# Patient Record
Sex: MALE | Race: WHITE | Employment: FULL TIME | URBAN - METROPOLITAN AREA
[De-identification: names, ages, dates, MRNs, and addresses within clinical notes are randomized per-mention and may not be internally consistent; named-entity substitution may affect disease eponyms.]

---

## 2018-03-01 ENCOUNTER — OFFICE VISIT (OUTPATIENT)
Dept: FAMILY MEDICINE | Facility: CLINIC | Age: 18
End: 2018-03-01
Payer: COMMERCIAL

## 2018-03-01 VITALS
BODY MASS INDEX: 33.69 KG/M2 | SYSTOLIC BLOOD PRESSURE: 132 MMHG | HEART RATE: 68 BPM | HEIGHT: 76 IN | WEIGHT: 276.7 LBS | RESPIRATION RATE: 19 BRPM | DIASTOLIC BLOOD PRESSURE: 76 MMHG | TEMPERATURE: 98 F | OXYGEN SATURATION: 98 %

## 2018-03-01 DIAGNOSIS — R19.4 CHANGE IN BOWEL HABITS: ICD-10-CM

## 2018-03-01 DIAGNOSIS — R10.33 PERIUMBILICAL ABDOMINAL PAIN: Primary | ICD-10-CM

## 2018-03-01 LAB
ERYTHROCYTE [DISTWIDTH] IN BLOOD BY AUTOMATED COUNT: 12.3 % (ref 10–15)
HCT VFR BLD AUTO: 45 % (ref 40–53)
HGB BLD-MCNC: 15.8 G/DL (ref 13.3–17.7)
LIPASE SERPL-CCNC: 98 U/L (ref 73–393)
MCH RBC QN AUTO: 29.6 PG (ref 26.5–33)
MCHC RBC AUTO-ENTMCNC: 35.1 G/DL (ref 31.5–36.5)
MCV RBC AUTO: 84 FL (ref 78–100)
PLATELET # BLD AUTO: 236 10E9/L (ref 150–450)
RBC # BLD AUTO: 5.34 10E12/L (ref 4.4–5.9)
WBC # BLD AUTO: 6.3 10E9/L (ref 4–11)

## 2018-03-01 PROCEDURE — 83690 ASSAY OF LIPASE: CPT | Performed by: PHYSICIAN ASSISTANT

## 2018-03-01 PROCEDURE — 80053 COMPREHEN METABOLIC PANEL: CPT | Performed by: PHYSICIAN ASSISTANT

## 2018-03-01 PROCEDURE — 36415 COLL VENOUS BLD VENIPUNCTURE: CPT | Performed by: PHYSICIAN ASSISTANT

## 2018-03-01 PROCEDURE — 85027 COMPLETE CBC AUTOMATED: CPT | Performed by: PHYSICIAN ASSISTANT

## 2018-03-01 PROCEDURE — 84439 ASSAY OF FREE THYROXINE: CPT | Performed by: PHYSICIAN ASSISTANT

## 2018-03-01 PROCEDURE — 84443 ASSAY THYROID STIM HORMONE: CPT | Performed by: PHYSICIAN ASSISTANT

## 2018-03-01 PROCEDURE — 99213 OFFICE O/P EST LOW 20 MIN: CPT | Performed by: PHYSICIAN ASSISTANT

## 2018-03-01 NOTE — PATIENT INSTRUCTIONS
I would like you to start taking a probiotic daily. You can get it at any drug store.    I would also like you to start taking a daily fiber supplement such as metamucil

## 2018-03-01 NOTE — MR AVS SNAPSHOT
"              After Visit Summary   3/1/2018    Huy Perez    MRN: [id number]           Patient Information     Date Of Birth          2000        Visit Information        Provider Department      3/1/2018 1:40 PM Kevon Rosas PA-C [facility]        Today's Diagnoses     Periumbilical abdominal pain    -  1    Change in bowel habits          Care Instructions    I would like you to start taking a probiotic daily. You can get it at any drug store.    I would also like you to start taking a daily fiber supplement such as metamucil          Follow-ups after your visit        Who to contact     If you have questions or need follow up information about today's clinic visit or your schedule please contact [facility] directly at [phone].  Normal or non-critical lab and imaging results will be communicated to you by Granifyhart, letter or phone within 4 business days after the clinic has received the results. If you do not hear from us within 7 days, please contact the clinic through Granifyhart or phone. If you have a critical or abnormal lab result, we will notify you by phone as soon as possible.  Submit refill requests through Swifto or call your pharmacy and they will forward the refill request to us. Please allow 3 business days for your refill to be completed.          Additional Information About Your Visit        GranifyharRadiojar Information     Swifto lets you send messages to your doctor, view your test results, renew your prescriptions, schedule appointments and more. To sign up, go to [email or web address] . Click on \"Log in\" on the left side of the screen, which will take you to the Welcome page. Then click on \"Sign up Now\" on the right side of the page.     You will be asked to enter the access code listed below, as well as some personal information. Please follow the directions to create your username and password.     Your access code is: [email or web address]  Expires: " "2018  2:31 PM     Your access code will  in 90 days. If you need help or a new code, please call your [place] clinic or [phone].        Care EveryWhere ID     This is your Care EveryWhere ID. This could be used by other organizations to access your [place] medical records  [id number]        Your Vitals Were     Pulse Temperature Respirations Height Pulse Oximetry BMI (Body Mass Index)    68 98  F (36.7  C) (Tympanic) 19 6' 3.75\" (1.924 m) 98% 33.9 kg/m2       Blood Pressure from Last 3 Encounters:   18 132/76   16 108/72    Weight from Last 3 Encounters:   18 276 lb 11.2 oz (125.5 kg) (>99 %)*   16 209 lb 4.8 oz (94.9 kg) (96 %)*   06 86 lb (39 kg) (98 %)*     * Growth percentiles are based on [facility] 2-20 Years data.              We Performed the Following     CBC with platelets     Comprehensive metabolic panel     Lipase     TSH with free T4 reflex          Today's Medication Changes          These changes are accurate as of 3/1/18  2:31 PM.  If you have any questions, ask your nurse or doctor.               Stop taking these medicines if you haven't already. Please contact your care team if you have questions.     lisdexamfetamine 60 MG capsule   Commonly known as:  VYVANSE   Stopped by:  Kevon Rosas PA-C                    Primary Care Provider Office Phone # Fax #    Kevon Rosas PA-C [phone] [id number] [facility] [id number]        Equal Access to Services     [facility] AH: Hadii matt diaz hadashzeinab Soandrew, waaxda luqadaha, qaybta kaalmada aura mena. So [facility] [phone].    ATENCIÓN: Si habla español, tiene a barger disposición servicios gratuitos de asistencia lingüística. Llame al [phone].    We comply with applicable federal civil rights laws and [place] laws. We do not discriminate on the basis of race, color, national origin, age, disability, sex, sexual orientation, " or gender identity.            Thank you!     Thank you for choosing [facility]  for your care. Our goal is always to provide you with excellent care. Hearing back from our patients is one way we can continue to improve our services. Please take a few minutes to complete the written survey that you may receive in the mail after your visit with us. Thank you!             Your Updated Medication List - Protect others around you: Learn how to safely use, store and throw away your medicines at www.disposemymeds.org.          This list is accurate as of 3/1/18  2:31 PM.  Always use your most recent med list.                   Brand Name Dispense Instructions for use Diagnosis    clindamycin-benzoyl peroxide gel    BENZACLIN          tretinoin 0.025 % cream    RETIN-A

## 2018-03-01 NOTE — PROGRESS NOTES
"  SUBJECTIVE:   Huy Perez is a 17 year old male who presents to clinic today for the following health issues:    Gastrointestinal symptoms      Duration: 1 year     Description: ABDOMINAL PAIN - lower abdomen. Pain is described as aching.    Intensity:  moderate    Accompanying signs and symptoms:  nausea, vomitting, diarrhea, constipation and painful bowel movements    History  Previous {similar problem: YES- ongoing for a year   Previous evaluation:  none    Aggravating factors: none    Alleviating factors: None    Other Therapies tried: Tried to eat healthier, did not see a difference.    -Patient presents to discuss a one year hx of abdominal pain  -He notes it correlates with when he began weight resistance training  -Pain is not daily, more random;   -Estimates it occurs once q-o-week  -will last almost on entire day when the pain is on  -BMs seem to help for just a few minutes  -no change with food  -Is moving the bowels on avg 5-10 times daily   -sometimes painful BMs   -BMs are softer   -very rare vomiting  -there is no blood in the stool  -there are no episodes of night sweats, fevers, chills or extensive weight loss  -he denies urinary symptoms     -no travel history  -no antibiotic use    -Does take some supplements - amino acids; protein powders; creatine   -He has been on those since the onset of the pain  -he has recently been doing more \"bulking\", eating a lot of food and only taking creatine    -denies any anxiety    -he drinks quite a bit of water daily; estimates more than one gallon    Problem list and histories reviewed & adjusted, as indicated.  Additional history: as documented    Patient Active Problem List   Diagnosis     ADHD (attention deficit hyperactivity disorder)     Acne vulgaris     History reviewed. No pertinent surgical history.    Social History   Substance Use Topics     Smoking status: Never Smoker     Smokeless tobacco: Never Used     Alcohol use No     Family History " "  Problem Relation Age of Onset     Hypertension No family hx of            Reviewed and updated as needed this visit by clinical staff       Reviewed and updated as needed this visit by Provider         ROS:  Constitutional, HEENT, cardiovascular, pulmonary, gi and gu systems are negative, except as otherwise noted.    OBJECTIVE:     /76 (BP Location: Right arm, Patient Position: Chair, Cuff Size: Adult Large)  Pulse 68  Temp 98  F (36.7  C) (Tympanic)  Resp 19  Ht 6' 3.75\" (1.924 m)  Wt 276 lb 11.2 oz (125.5 kg)  SpO2 98%  BMI 33.9 kg/m2  Body mass index is 33.9 kg/(m^2).  GENERAL: healthy, alert and no distress  RESP: lungs clear to auscultation - no rales, rhonchi or wheezes  CV: regular rates and rhythm, normal S1 S2, no S3 or S4 and no murmur, click or rub  ABDOMEN: soft, nontender, no hepatosplenomegaly, no masses and bowel sounds normal  PSYCH: mentation appears normal, affect normal/bright    Diagnostic Test Results:  See A/P    ASSESSMENT/PLAN:   1. Periumbilical abdominal pain  2. Change in bowel habits  Huy presents with a one year hx of intermittent abdominal pain and increased BM. He notes the symptoms began soon after he began intensive focus on weight resistance training. He has added supplements to his diet, initially block chain amino acids and protein powders, more recently creatine loading. He is \"bulking\" currently and thus eating \"a lot more food than usual\" and \"drinking tons of water, more than a gallon\" daily. There is no blood in the stool. Symptoms last around one day and do not recur for a week or two. BMs continue with frequency regularly. There is no recent travel or antibiotic use. Certainly this diet/training could be playing a role. I discussed this with him. We'll also run some labs, though he preferred to hold off on stool studies or celiac testing. He denies problems with lactose. I have also advised starting a probiotic and fiber supplement. He'll follow up per the " labs and symptoms after around one month trial.    Kevon Rosas PA-C  [facility]

## 2018-03-02 LAB
ALBUMIN SERPL-MCNC: 4.6 G/DL (ref 3.4–5)
ALP SERPL-CCNC: 75 U/L (ref 65–260)
ALT SERPL W P-5'-P-CCNC: 50 U/L (ref 0–50)
ANION GAP SERPL CALCULATED.3IONS-SCNC: 3 MMOL/L (ref 3–14)
AST SERPL W P-5'-P-CCNC: 31 U/L (ref 0–35)
BILIRUB SERPL-MCNC: 0.4 MG/DL (ref 0.2–1.3)
BUN SERPL-MCNC: 14 MG/DL (ref 7–30)
CALCIUM SERPL-MCNC: 9.5 MG/DL (ref 8.5–10.1)
CHLORIDE SERPL-SCNC: 103 MMOL/L (ref 98–110)
CO2 SERPL-SCNC: 30 MMOL/L (ref 20–32)
CREAT SERPL-MCNC: 1.02 MG/DL (ref 0.5–1)
GFR SERPL CREATININE-BSD FRML MDRD: >90 ML/MIN/1.7M2
GLUCOSE SERPL-MCNC: 89 MG/DL (ref 70–99)
POTASSIUM SERPL-SCNC: 4.2 MMOL/L (ref 3.4–5.3)
PROT SERPL-MCNC: 7.5 G/DL (ref 6.8–8.8)
SODIUM SERPL-SCNC: 136 MMOL/L (ref 133–144)
T4 FREE SERPL-MCNC: 0.68 NG/DL (ref 0.76–1.46)
TSH SERPL DL<=0.005 MIU/L-ACNC: 6.02 MU/L (ref 0.4–4)

## 2018-03-04 DIAGNOSIS — R94.6 ABNORMAL FINDING ON THYROID FUNCTION TEST: Primary | ICD-10-CM

## 2018-03-27 DIAGNOSIS — R94.6 ABNORMAL FINDING ON THYROID FUNCTION TEST: ICD-10-CM

## 2018-03-27 PROCEDURE — 84443 ASSAY THYROID STIM HORMONE: CPT | Performed by: PHYSICIAN ASSISTANT

## 2018-03-27 PROCEDURE — 84439 ASSAY OF FREE THYROXINE: CPT | Performed by: PHYSICIAN ASSISTANT

## 2018-03-27 PROCEDURE — 36415 COLL VENOUS BLD VENIPUNCTURE: CPT | Performed by: PHYSICIAN ASSISTANT

## 2018-03-28 LAB
T4 FREE SERPL-MCNC: 0.74 NG/DL (ref 0.76–1.46)
TSH SERPL DL<=0.005 MIU/L-ACNC: 3.48 MU/L (ref 0.4–4)

## 2020-10-02 ENCOUNTER — APPOINTMENT (OUTPATIENT)
Dept: GENERAL RADIOLOGY | Facility: CLINIC | Age: 20
End: 2020-10-02
Attending: NURSE PRACTITIONER
Payer: OTHER MISCELLANEOUS

## 2020-10-02 ENCOUNTER — HOSPITAL ENCOUNTER (EMERGENCY)
Facility: CLINIC | Age: 20
Discharge: HOME | End: 2020-10-02
Attending: NURSE PRACTITIONER | Admitting: NURSE PRACTITIONER
Payer: OTHER MISCELLANEOUS

## 2020-10-02 VITALS
BODY MASS INDEX: 31.86 KG/M2 | RESPIRATION RATE: 16 BRPM | WEIGHT: 260 LBS | TEMPERATURE: 98.6 F | OXYGEN SATURATION: 98 % | SYSTOLIC BLOOD PRESSURE: 138 MMHG | HEART RATE: 77 BPM | DIASTOLIC BLOOD PRESSURE: 79 MMHG

## 2020-10-02 DIAGNOSIS — S81.811A LACERATION OF RIGHT LOWER EXTREMITY, INITIAL ENCOUNTER: ICD-10-CM

## 2020-10-02 DIAGNOSIS — M25.572 PAIN IN JOINT INVOLVING ANKLE AND FOOT, LEFT: ICD-10-CM

## 2020-10-02 DIAGNOSIS — M79.661 PAIN OF RIGHT LOWER LEG: ICD-10-CM
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
## 2020-10-02 DIAGNOSIS — M25.571 PAIN IN JOINT INVOLVING ANKLE AND FOOT, RIGHT: ICD-10-CM

## 2020-10-02 PROCEDURE — 90471 IMMUNIZATION ADMIN: CPT | Performed by: NURSE PRACTITIONER

## 2020-10-02 PROCEDURE — 90715 TDAP VACCINE 7 YRS/> IM: CPT | Performed by: NURSE PRACTITIONER

## 2020-10-02 PROCEDURE — 250N000011 HC RX IP 250 OP 636: Performed by: NURSE PRACTITIONER

## 2020-10-02 PROCEDURE — 73590 X-RAY EXAM OF LOWER LEG: CPT | Mod: RT

## 2020-10-02 PROCEDURE — 12001 RPR S/N/AX/GEN/TRNK 2.5CM/<: CPT | Performed by: NURSE PRACTITIONER

## 2020-10-02 PROCEDURE — 73630 X-RAY EXAM OF FOOT: CPT | Mod: RT

## 2020-10-02 PROCEDURE — 90471 IMMUNIZATION ADMIN: CPT

## 2020-10-02 PROCEDURE — 250N000013 HC RX MED GY IP 250 OP 250 PS 637: Performed by: NURSE PRACTITIONER

## 2020-10-02 PROCEDURE — 99285 EMERGENCY DEPT VISIT HI MDM: CPT | Mod: 25 | Performed by: NURSE PRACTITIONER

## 2020-10-02 PROCEDURE — 99284 EMERGENCY DEPT VISIT MOD MDM: CPT | Mod: 25 | Performed by: NURSE PRACTITIONER

## 2020-10-02 PROCEDURE — 73610 X-RAY EXAM OF ANKLE: CPT | Mod: 50

## 2020-10-02 RX ADMIN — IBUPROFEN 600 MG: 400 TABLET ORAL at 15:12

## 2020-10-02 RX ADMIN — CLOSTRIDIUM TETANI TOXOID ANTIGEN (FORMALDEHYDE INACTIVATED), CORYNEBACTERIUM DIPHTHERIAE TOXOID ANTIGEN (FORMALDEHYDE INACTIVATED), BORDETELLA PERTUSSIS TOXOID ANTIGEN (GLUTARALDEHYDE INACTIVATED), BORDETELLA PERTUSSIS FILAMENTOUS HEMAGGLUTININ ANTIGEN (FORMALDEHYDE INACTIVATED), BORDETELLA PERTUSSIS PERTACTIN ANTIGEN, AND BORDETELLA PERTUSSIS FIMBRIAE 2/3 ANTIGEN 0.5 ML: 5; 2; 2.5; 5; 3; 5 INJECTION, SUSPENSION INTRAMUSCULAR at 14:26

## 2020-10-02 ASSESSMENT — ENCOUNTER SYMPTOMS
BACK PAIN: 0
FEVER: 0
WEAKNESS: 0
NUMBNESS: 0
MYALGIAS: 1
NAUSEA: 0
COUGH: 0
VOMITING: 0
LIGHT-HEADEDNESS: 0
HEADACHES: 0
SHORTNESS OF BREATH: 0
DIZZINESS: 0
CHILLS: 0
NECK PAIN: 0
HEMATURIA: 0
WOUND: 1
JOINT SWELLING: 0
FATIGUE: 0
ABDOMINAL PAIN: 0
FACIAL SWELLING: 0
ARTHRALGIAS: 1
NECK STIFFNESS: 0

## 2020-10-02 NOTE — ED PROVIDER NOTES
"  History     Chief Complaint   Patient presents with     Laceration     pt coming to ED via EMS for fall from ladder, 2\" lac R leg, bilat ankle pain, no head injury, bleeding controlled.      HPI  Huy Perez is a 20 year old male who presents to the emergency department after suffering a fall while at work prior to arrival. Patient was standing on a ladder ~11 feet above a deck when it slipped causing him to fall. He fell straight down and landed still standing on the ladder. He suffered a laceration to the right shin from the pulley system on the ladder. There was no head injury or LOC, denies neck, back or hip pain. He is complaining of bilateral ankle pain and right sided tib/fib and foot pain. Denies numbness or tingling. No loss of bowel or bladder control. No saddle anesthesia. He arrived via EMS and is here with his mother.     Allergies:  No Known Allergies    Problem List:    Patient Active Problem List    Diagnosis Date Noted     ADHD (attention deficit hyperactivity disorder) 02/03/2016     Priority: Medium     Acne vulgaris 02/03/2016     Priority: Medium     Cleocin and retin A 025%        Past Medical History:    No past medical history on file.    Past Surgical History:    No past surgical history on file.    Family History:    Family History   Problem Relation Age of Onset     Hypertension No family hx of      Social History:  Marital Status:  Single [1]  Social History     Tobacco Use     Smoking status: Never Smoker     Smokeless tobacco: Never Used   Substance Use Topics     Alcohol use: No     Alcohol/week: 0.0 standard drinks     Drug use: No      Medications:         clindamycin-benzoyl peroxide (BENZACLIN) gel       tretinoin (RETIN-A) 0.025 % cream      Review of Systems   Constitutional: Negative for chills, fatigue and fever.   HENT: Negative for facial swelling.    Respiratory: Negative for cough and shortness of breath.    Cardiovascular: Negative for chest pain. "   Gastrointestinal: Negative for abdominal pain, nausea and vomiting.   Genitourinary: Negative for hematuria.   Musculoskeletal: Positive for arthralgias and myalgias. Negative for back pain, joint swelling, neck pain and neck stiffness.   Skin: Positive for wound. Negative for rash.   Neurological: Negative for dizziness, syncope, weakness, light-headedness, numbness and headaches.   All other systems reviewed and are negative.    Physical Exam   BP: 138/79  Pulse: 77  Temp: 98.6  F (37  C)  Resp: 16  Weight: 117.9 kg (260 lb)  SpO2: 98 %      Physical Exam  Constitutional:       General: He is not in acute distress.     Appearance: He is well-developed. He is not diaphoretic.   HENT:      Head: Normocephalic.   Eyes:      Conjunctiva/sclera: Conjunctivae normal.      Pupils: Pupils are equal, round, and reactive to light.   Neck:      Musculoskeletal: Normal range of motion and neck supple.   Cardiovascular:      Rate and Rhythm: Normal rate and regular rhythm.      Pulses: Normal pulses.   Pulmonary:      Effort: Pulmonary effort is normal. No respiratory distress.      Breath sounds: Normal breath sounds and air entry. No decreased air movement. No decreased breath sounds, wheezing or rhonchi.   Abdominal:      General: There is no distension.      Palpations: Abdomen is soft.      Tenderness: There is no abdominal tenderness.   Musculoskeletal: Normal range of motion.      Comments: Reported pain to the bilateral ankles. There is no deficits in either leg, ankle or foot. Pulses and perfusion are equal bilaterally. No overlying erythema, edema, abrasions, or ecchymosis. There is a 1 cm laceration to the proximal right shin, continued superficial abrasion proximally, bleeding controlled.      Skin:     General: Skin is warm.      Capillary Refill: Capillary refill takes less than 2 seconds.   Neurological:      General: No focal deficit present.      Mental Status: He is alert and oriented to person, place, and  time.   Psychiatric:         Mood and Affect: Mood normal.       ED Course        [facility]     -Laceration Repair    Date/Time: 10/2/2020 2:47 PM  Performed by: Thalia Mercado APRN CNP  Authorized by: Thalia Mercado APRN CNP       ANESTHESIA (see MAR for exact dosages):     Anesthesia method:  Local infiltration    Local anesthetic:  Procaine 0.5% w/o epi  LACERATION DETAILS     Location:  Leg    Leg location:  R lower leg    Length (cm):  1  EXPLORATION:     Wound exploration: wound explored through full range of motion and entire depth of wound probed and visualized      Wound extent: no underlying fracture      Contaminated: no      TREATMENT:     Area cleansed with:  Betadine and Hibiclens    Amount of cleaning:  Standard    Irrigation solution:  Sterile saline    Irrigation volume:  500    Irrigation method:  Syringe and pressure wash    Visualized foreign bodies/material removed: yes      SKIN REPAIR     Repair method:  Sutures    Suture size:  5-0    Suture material:  Nylon    Suture technique:  Simple interrupted    Number of sutures:  2    APPROXIMATION     Approximation:  Close    POST-PROCEDURE DETAILS     Dressing:  Antibiotic ointment and non-adherent dressing      PROCEDURE   Patient Tolerance:  Patient tolerated the procedure well with no immediate complications          Results for orders placed or performed during the hospital encounter of 10/02/20 (from the past 24 hour(s))   XR Ankle Left G/E 3 Views    Narrative    RIGHT FOOT THREE OR MORE VIEWS, TIBIA AND FIBULA RIGHT TWO VIEWS,  RIGHT ANKLE THREE OR MORE VIEWS, LEFT ANKLE THREE OR MORE VIEWS  10/2/2020 2:14 PM     HISTORY: Fall.    COMPARISON: None.      Impression    IMPRESSION:     Right ankle: No bony or soft tissue abnormality.    Right foot: No bony or soft tissue abnormality.    Right tibia/fibula: No bony or soft tissue abnormality.    Left ankle: No bony or soft tissue abnormality.   Foot  XR, G/E 3  views, right    Narrative    RIGHT FOOT THREE OR MORE VIEWS, TIBIA AND FIBULA RIGHT TWO VIEWS,  RIGHT ANKLE THREE OR MORE VIEWS, LEFT ANKLE THREE OR MORE VIEWS  10/2/2020 2:14 PM     HISTORY: Fall.    COMPARISON: None.      Impression    IMPRESSION:     Right ankle: No bony or soft tissue abnormality.    Right foot: No bony or soft tissue abnormality.    Right tibia/fibula: No bony or soft tissue abnormality.    Left ankle: No bony or soft tissue abnormality.   Ankle XR, G/E 3 views, right    Narrative    RIGHT FOOT THREE OR MORE VIEWS, TIBIA AND FIBULA RIGHT TWO VIEWS,  RIGHT ANKLE THREE OR MORE VIEWS, LEFT ANKLE THREE OR MORE VIEWS  10/2/2020 2:14 PM     HISTORY: Fall.    COMPARISON: None.      Impression    IMPRESSION:     Right ankle: No bony or soft tissue abnormality.    Right foot: No bony or soft tissue abnormality.    Right tibia/fibula: No bony or soft tissue abnormality.    Left ankle: No bony or soft tissue abnormality.   XR Tibia & Fibula Right 2 Views    Narrative    RIGHT FOOT THREE OR MORE VIEWS, TIBIA AND FIBULA RIGHT TWO VIEWS,  RIGHT ANKLE THREE OR MORE VIEWS, LEFT ANKLE THREE OR MORE VIEWS  10/2/2020 2:14 PM     HISTORY: Fall.    COMPARISON: None.      Impression    IMPRESSION:     Right ankle: No bony or soft tissue abnormality.    Right foot: No bony or soft tissue abnormality.    Right tibia/fibula: No bony or soft tissue abnormality.    Left ankle: No bony or soft tissue abnormality.       Medications   Tdap (tetanus-diphtheria-acell pertussis) (ADACEL) injection 0.5 mL (0.5 mLs Intramuscular Given 10/2/20 1426)     Assessments & Plan (with Medical Decision Making)   Huy Perez is a 20 year old male who presents to the emergency department after suffering a fall while at work prior to arrival. Patient was standing on a ladder ~11 feet above a deck when it slipped causing him to fall. He fell straight down and landed still standing on the ladder. He suffered a laceration to the right  shin from the pulley system on the ladder. There was no head injury or LOC, denies neck, back or hip pain. He is complaining of bilateral ankle pain and right sided tib/fib and foot pain. Xray of the right tib/fib, ankle, foot and left ankle obtained with no acute fracture or abnormality. 2 sutures placed as indicated above. Remove in 7-10 days. Educated on wound care and reasons to seek wound care prior to discharge. Tetanus updated. OTC pain control. Return precautions reviewed, all questions answered. Patient and mother are agreeable to plan of care and patient discharged in no acute distress.     I have reviewed the nursing notes.    I have reviewed the findings, diagnosis, plan and need for follow up with the patient.  New Prescriptions    No medications on file     Final diagnoses:   Fall, initial encounter   Pain of right lower leg   Pain in joint involving ankle and foot, right   Pain in joint involving ankle and foot, left   Laceration of right lower extremity, initial encounter     10/2/2020   [facility] EMERGENCY DEPT     Thalia Mercado, APRN CNP  10/02/20 1459

## 2020-10-02 NOTE — ED AVS SNAPSHOT
[facility] Emergency Dept  [address] [address]  Phone: [phone]  Fax: [phone]                                    Huy Perez   MRN: [id number]    Department: [facility] Emergency Dept   Date of Visit: 10/2/2020           After Visit Summary Signature Page    I have received my discharge instructions, and my questions have been answered. I have discussed any challenges I see with this plan with the nurse or doctor.    ..........................................................................................................................................  Patient/Patient Representative Signature      ..........................................................................................................................................  Patient Representative Print Name and Relationship to Patient    ..................................................               ................................................  Date                                   Time    ..........................................................................................................................................  Reviewed by Signature/Title    ...................................................              ..............................................  Date                                               Time          22EPIC Rev 08/18

## 2020-10-02 NOTE — ED TRIAGE NOTES
Pt had fall from ladder ~ 12 feet. Lac to left shin-bleeding controlled. Able to bear weight. Endorses pain to BLE. Pedal pulses 2+. Pt states numbness/loss of sensation in left foot.  Denies hitting head.

## 2020-11-02 ENCOUNTER — OFFICE VISIT (OUTPATIENT)
Dept: FAMILY MEDICINE | Facility: CLINIC | Age: 20
End: 2020-11-02
Payer: OTHER MISCELLANEOUS

## 2020-11-02 VITALS
SYSTOLIC BLOOD PRESSURE: 120 MMHG | HEART RATE: 76 BPM | DIASTOLIC BLOOD PRESSURE: 68 MMHG | RESPIRATION RATE: 16 BRPM | WEIGHT: 269.8 LBS | TEMPERATURE: 97.9 F | HEIGHT: 76 IN | BODY MASS INDEX: 32.85 KG/M2 | OXYGEN SATURATION: 99 %

## 2020-11-02 DIAGNOSIS — L03.119 CELLULITIS AND ABSCESS OF LEG: Primary | ICD-10-CM

## 2020-11-02 DIAGNOSIS — L02.419 CELLULITIS AND ABSCESS OF LEG: Primary | ICD-10-CM

## 2020-11-02 PROCEDURE — 99213 OFFICE O/P EST LOW 20 MIN: CPT | Performed by: FAMILY MEDICINE

## 2020-11-02 RX ORDER — SULFAMETHOXAZOLE/TRIMETHOPRIM 800-160 MG
1 TABLET ORAL 2 TIMES DAILY
Qty: 20 TABLET | Refills: 0 | Status: SHIPPED | OUTPATIENT
Start: 2020-11-02 | End: 2020-11-12

## 2020-11-02 ASSESSMENT — MIFFLIN-ST. JEOR: SCORE: 2325.3

## 2020-11-02 NOTE — PROGRESS NOTES
Subjective     Huy Perez is a 20 year old male who presents to clinic today for the following health issues:    HPI        This is a work related injury ( work comp)   Concern - wound check   Onset: x yesterday  Description: redness, oozing brown, odor   Intensity: mild  Progression of Symptoms:  same  Accompanying Signs & Symptoms: tenderness to the  Touch of the wound  Previous history of similar problem: Had stiches removed about 1-2 weeks ago after fall/laceration (see ed note).   Precipitating factors:        Worsened by: none  Alleviating factors:        Improved by: none  Therapies tried and outcome: showering         Cellulitis and abscess of leg :        Problem list, Medication list, Allergies, and Medical/Social/Surgical histories reviewed in [facility] and updated as appropriate.  Labs reviewed in EPIC  BP Readings from Last 3 Encounters:   11/02/20 120/68   10/02/20 138/79   03/01/18 132/76    Wt Readings from Last 3 Encounters:   11/02/20 122.4 kg (269 lb 12.8 oz)   10/02/20 117.9 kg (260 lb)   03/01/18 125.5 kg (276 lb 11.2 oz) (>99 %, Z= 2.77)*     * Growth percentiles are based on CDC (Boys, 2-20 Years) data.                  Patient Active Problem List   Diagnosis     ADHD (attention deficit hyperactivity disorder)     Acne vulgaris     History reviewed. No pertinent surgical history.    Social History     Tobacco Use     Smoking status: Never Smoker     Smokeless tobacco: Never Used   Substance Use Topics     Alcohol use: No     Alcohol/week: 0.0 standard drinks     Family History   Problem Relation Age of Onset     Hypertension No family hx of          No current outpatient medications on file.     No Known Allergies  Recent Labs   Lab Test 03/27/18  1758 03/01/18  1434   ALT  --  50   CR  --  1.02*   GFRESTIMATED  --  >90   GFRESTBLACK  --  >90   POTASSIUM  --  4.2   TSH 3.48 6.02*        ROS:  Constitutional, HEENT, cardiovascular, pulmonary, GI, , musculoskeletal, neuro, skin, endocrine  "and psych systems are negative, except as otherwise noted.        OBJECTIVE:  /68 (BP Location: Right arm, Cuff Size: Adult Large)   Pulse 76   Temp 97.9  F (36.6  C) (Oral)   Resp 16   Ht 1.93 m (6' 4\")   Wt 122.4 kg (269 lb 12.8 oz)   SpO2 99%   BMI 32.84 kg/m      EXAM:  GENERAL APPEARANCE: healthy, alert and no distress    Left leg anterior shin with 1 cm round superficial hard scab with 0.5 cm surrounding erythema.  Scant yellow discharge around the edge.       ASSESSMENT AND PLAN  Patient Instructions   Apply antibiotic ointment to wound 1-2 times daily and cover with a large bandaid     Wet with warm wash cloth 1-2 times daily and they gently rub out the adherent material in the center of the wound to help it drain.     If not improving in 24-48 hours then start the antibiotics.     ASSESSMENT AND PLAN  1. Cellulitis and abscess of leg    - sulfamethoxazole-trimethoprim (BACTRIM DS) 800-160 MG tablet; Take 1 tablet by mouth 2 times daily for 10 days  Dispense: 20 tablet; Refill: 0        MYCHART SIGN UP: [email or web address] , 1-219.250.9105    E-VISITS CAN BE DONE FOR CARE/PRESCRIPTIONS WHICH MAY NOT NEED AN IN-PERSON ASSESSMENT - click \"on-line care, then request e-visit\".      ONCARE VISIT/PRESCRIPTIONS: [email or web address]  - we treat nearly 50 common conditions with one hour response time     RADIOLOGY SCHEDULING  [facility]:  [phone]   [facility]: [phone]  [facility]: [phone]    Mammogram and Colonoscopy [phone]    Smoking Cessation: www.quitplan.org, 0-334-932-PLAN (2714)    Pharmacy savings card application: www.[employer]    CONSUMER PRICE LINE for estimates of test costs:  [phone]         Joel Wegener, MD        "

## 2020-11-03 NOTE — PATIENT INSTRUCTIONS
Apply antibiotic ointment to wound 1-2 times daily and cover with a large bandaid     Wet with warm wash cloth 1-2 times daily and they gently rub out the adherent material in the center of the wound to help it drain.     If not improving in 24-48 hours then start the antibiotics.

## 2021-02-03 ENCOUNTER — TRANSFERRED RECORDS (OUTPATIENT)
Dept: HEALTH INFORMATION MANAGEMENT | Facility: CLINIC | Age: 21
End: 2021-02-03

## 2021-02-04 ENCOUNTER — OFFICE VISIT (OUTPATIENT)
Dept: FAMILY MEDICINE | Facility: CLINIC | Age: 21
End: 2021-02-04
Payer: COMMERCIAL

## 2021-02-04 VITALS
OXYGEN SATURATION: 98 % | TEMPERATURE: 98 F | SYSTOLIC BLOOD PRESSURE: 132 MMHG | WEIGHT: 282 LBS | RESPIRATION RATE: 20 BRPM | DIASTOLIC BLOOD PRESSURE: 80 MMHG | HEIGHT: 76 IN | HEART RATE: 74 BPM | BODY MASS INDEX: 34.34 KG/M2

## 2021-02-04 DIAGNOSIS — L91.8 SKIN TAG: Primary | ICD-10-CM

## 2021-02-04 PROCEDURE — 11200 RMVL SKIN TAGS UP TO&INC 15: CPT | Performed by: FAMILY MEDICINE

## 2021-02-04 ASSESSMENT — MIFFLIN-ST. JEOR: SCORE: 2384.61

## 2021-02-04 ASSESSMENT — ENCOUNTER SYMPTOMS: ROS SKIN COMMENTS: SKIN TAG

## 2021-02-04 NOTE — PROGRESS NOTES
"  Assessment and Plan    (L91.8) Skin tag  (primary encounter diagnosis)  Comment: Verbal consent obtained.  Area prepped with alcohol.  Anesthesia with 1% lido with epi.  Skin tag removed with iris scissors.  Dry dressing applied.  Patient tolerated procedure well.    Plan: REMOVAL OF SKIN TAGS, FIRST 15              RTC in 1y for CPE    Gaudencio Rivas MD      Mann Son is a 20 year old who presents to clinic today for the following health issues     HPI       Concern - skin tag  Onset: couple years  Description: skin tag on lower back( wants removed), is embarrassing  Intensity: mild  Progression of Symptoms:  worsening  Accompanying Signs & Symptoms: none  Previous history of similar problem: NA  Precipitating factors:        Worsened by: NA  Alleviating factors:        Improved by: nothing  Therapies tried and outcome: None    Not inflamed or tender, but annoying and would like removed    Review of Systems   Skin:        Skin tag            Objective    /80 (BP Location: Right arm, Patient Position: Sitting, Cuff Size: Adult Large)   Pulse 74   Temp 98  F (36.7  C) (Oral)   Resp 20   Ht 1.937 m (6' 4.25\")   Wt 127.9 kg (282 lb)   SpO2 98%   BMI 34.10 kg/m    Body mass index is 34.1 kg/m .  Physical Exam  Vitals signs and nursing note reviewed.   Constitutional:       Appearance: Normal appearance.   Skin:     General: Skin is warm.          Neurological:      General: No focal deficit present.      Mental Status: He is alert and oriented to person, place, and time.                        "

## 2021-05-07 ENCOUNTER — OFFICE VISIT (OUTPATIENT)
Dept: FAMILY MEDICINE | Facility: CLINIC | Age: 21
End: 2021-05-07
Payer: COMMERCIAL

## 2021-05-07 VITALS
DIASTOLIC BLOOD PRESSURE: 78 MMHG | HEIGHT: 76 IN | WEIGHT: 267.2 LBS | OXYGEN SATURATION: 99 % | BODY MASS INDEX: 32.54 KG/M2 | RESPIRATION RATE: 20 BRPM | HEART RATE: 81 BPM | TEMPERATURE: 99.8 F | SYSTOLIC BLOOD PRESSURE: 120 MMHG

## 2021-05-07 DIAGNOSIS — H66.001 NON-RECURRENT ACUTE SUPPURATIVE OTITIS MEDIA OF RIGHT EAR WITHOUT SPONTANEOUS RUPTURE OF TYMPANIC MEMBRANE: Primary | ICD-10-CM

## 2021-05-07 DIAGNOSIS — J02.9 ACUTE PHARYNGITIS, UNSPECIFIED ETIOLOGY: ICD-10-CM

## 2021-05-07 LAB
DEPRECATED S PYO AG THROAT QL EIA: NEGATIVE
SPECIMEN SOURCE: NORMAL
SPECIMEN SOURCE: NORMAL
STREP GROUP A PCR: NOT DETECTED

## 2021-05-07 PROCEDURE — 87651 STREP A DNA AMP PROBE: CPT | Performed by: PHYSICIAN ASSISTANT

## 2021-05-07 PROCEDURE — 99213 OFFICE O/P EST LOW 20 MIN: CPT | Performed by: PHYSICIAN ASSISTANT

## 2021-05-07 PROCEDURE — 99N1174 PR STATISTIC STREP A RAPID: Performed by: PHYSICIAN ASSISTANT

## 2021-05-07 ASSESSMENT — MIFFLIN-ST. JEOR: SCORE: 2308.51

## 2021-05-07 NOTE — PROGRESS NOTES
"    Assessment & Plan     Non-recurrent acute suppurative otitis media of right ear without spontaneous rupture of tympanic membrane  Acute pharyngitis, unspecified etiology  Strep negative but exam with significant Right AOM. Start below. Follow-up if not improving  - amoxicillin-clavulanate (AUGMENTIN) 875-125 MG tablet; Take 1 tablet by mouth 2 times daily  - Streptococcus A Rapid Scr w Reflx to PCR  - Group A Streptococcus PCR Throat Swab       BMI:   Estimated body mass index is 32.52 kg/m  as calculated from the following:    Height as of this encounter: 1.93 m (6' 4\").    Weight as of this encounter: 121.2 kg (267 lb 3.2 oz).         Return in about 1 year (around 5/7/2022) for recheck if symptoms are not improving..    KOSTA [facility] MARVIN Son is a 21 year old who presents for the following health issues     HPI     Acute Illness  Acute illness concerns: Ear and throat pain starting monday, Had 2 covid tests since Monday; both negative  Onset/Duration: Monday  Symptoms:  Fever: no  Chills/Sweats: no  Headache (location?): no  Sinus Pressure: no  Conjunctivitis:  no  Ear Pain: YES: right  Rhinorrhea: YES  Congestion: no  Sore Throat: YES  Cough: YES-productive of clear sputum  Wheeze: no  Decreased Appetite: no  Nausea: no  Vomiting: no  Diarrhea: no  Dysuria/Freq.: no  Dysuria or Hematuria: no  Fatigue/Achiness: no  Sick/Strep Exposure: no  Therapies tried and outcome: Dayquil, Tylenol     Huy Perez is a 21 year old male who presents today for new onset upper respiratory symptoms   Started with fatigue and headache  Has NOT had fevers  Normal BMs  Cough is sporadic; dry; no wheeze  No difficulty breathing  Dripping nose; limited congestion  Sore throat with swallowing; also some ear pain (r>L)  Was around friends over the weekend; none have symptoms  Took some dayquil today  Using ear ache drops    Review of Systems   Constitutional, HEENT, " "cardiovascular, pulmonary, gi and gu systems are negative, except as otherwise noted.      Objective    /78 (BP Location: Right arm, Patient Position: Chair, Cuff Size: Adult Regular)   Pulse 81   Temp 99.8  F (37.7  C) (Tympanic)   Resp 20   Ht 1.93 m (6' 4\")   Wt 121.2 kg (267 lb 3.2 oz)   SpO2 99%   BMI 32.52 kg/m    Body mass index is 32.52 kg/m .  Physical Exam   GENERAL: healthy, alert and no distress  EYES: Eyes grossly normal to inspection, PERRL and conjunctivae and sclerae normal  HENT: normal cephalic/atraumatic, right ear: erythematous and bulging membrane, left ear: normal: no effusions, no erythema, normal landmarks, nose and mouth without ulcers or lesions, oropharynx clear and oral mucous membranes moist  RESP: lungs clear to auscultation - no rales, rhonchi or wheezes  CV: regular rates and rhythm, normal S1 S2, no S3 or S4 and no murmur, click or rub    Results for orders placed or performed in visit on 05/07/21 (from the past 24 hour(s))   Streptococcus A Rapid Scr w Reflx to PCR    Specimen: Throat   Result Value Ref Range    Strep Specimen Description Throat     Streptococcus Group A Rapid Screen Negative NEG^Negative           "

## 2022-01-01 ENCOUNTER — TELEPHONE (OUTPATIENT)
Dept: FAMILY MEDICINE | Facility: CLINIC | Age: 22
End: 2022-01-01
Payer: COMMERCIAL

## 2022-01-01 ENCOUNTER — OFFICE VISIT (OUTPATIENT)
Dept: FAMILY MEDICINE | Facility: CLINIC | Age: 22
End: 2022-01-01
Payer: COMMERCIAL

## 2022-01-01 VITALS — SYSTOLIC BLOOD PRESSURE: 136 MMHG | DIASTOLIC BLOOD PRESSURE: 72 MMHG

## 2022-01-01 DIAGNOSIS — D18.01 CHERRY ANGIOMA: Primary | ICD-10-CM

## 2022-01-01 DIAGNOSIS — D48.9 NEOPLASM OF UNCERTAIN BEHAVIOR: ICD-10-CM

## 2022-01-01 DIAGNOSIS — D22.9 MULTIPLE PIGMENTED NEVI: ICD-10-CM

## 2022-01-01 LAB
PATH REPORT.COMMENTS IMP SPEC: NORMAL
PATH REPORT.FINAL DX SPEC: NORMAL
PATH REPORT.GROSS SPEC: NORMAL
PATH REPORT.MICROSCOPIC SPEC OTHER STN: NORMAL
PATH REPORT.RELEVANT HX SPEC: NORMAL

## 2022-01-01 PROCEDURE — 99203 OFFICE O/P NEW LOW 30 MIN: CPT | Mod: 25 | Performed by: PHYSICIAN ASSISTANT

## 2022-01-01 PROCEDURE — 88305 TISSUE EXAM BY PATHOLOGIST: CPT | Performed by: DERMATOLOGY

## 2022-01-01 PROCEDURE — 88342 IMHCHEM/IMCYTCHM 1ST ANTB: CPT | Performed by: DERMATOLOGY

## 2022-01-01 PROCEDURE — 11102 TANGNTL BX SKIN SINGLE LES: CPT | Performed by: PHYSICIAN ASSISTANT

## 2022-02-08 NOTE — PROGRESS NOTES
[facility] Dermatology Note  Encounter Date: Feb 8, 2022  Office Visit     Dermatology Problem List:  # NUB, R medial buttocks, congenital appearing nevus, growing  per patient r/o dysplasia, s/p bx 2/8/22    ____________________________________________    Assessment & Plan:    # NUB, R medial buttocks, growing congenital appearing nevus per patient r/o dysplasia  - Shave biopsy today, see procedure note below    # Multiple benign nevi.   - No concerning lesions today  - Monitor for ABCDEs of melanoma   - Continue sun protection - recommend SPF 30 or higher with frequent application   - Return sooner if noticing changing or symptomatic lesions    # Cherry angioma  Discussed the natural history and benign nature of this lesion. Reassurance provided that no additional treatment is necessary.     Procedures Performed:   - Shave biopsy procedure note, location(s): R medial buttocks. After discussion of benefits and risks including but not limited to bleeding, infection, scar, incomplete removal, recurrence, and non-diagnostic biopsy, written consent and photographs were obtained. The area was cleaned with isopropyl alcohol. 0.5mL of 1% lidocaine with epinephrine was injected to obtain adequate anesthesia of lesion(s). Shave biopsy at site(s) performed. Hemostasis was achieved with aluminium chloride. Petrolatum ointment and a sterile dressing were applied. The patient tolerated the procedure and no complications were noted. The patient was provided with verbal and written post care instructions.     Follow-up: prn for new or changing lesions    Staff and Scribe:     Scribe Disclosure:  I, Jazmine Bee, am serving as a scribe to document services personally performed by Selene Maldonado PA-C based on data collection and the provider's statements to me.     Provider Disclosure:   The documentation recorded by the scribe accurately reflects the services I personally performed and the decisions  made by me.    All risks, benefits and alternatives were discussed with patient.  Patient is in agreement and understands the assessment and plan.  All questions were answered.  Sun Screen Education was given.   Return to Clinic as needed.   Selene Maldonado PA-C   [facility]   ____________________________________________    CC: Derm Problem (sore at the top of buttock, spots on chest, scalp)    HPI:  Mr. Huy Perez is a(n) 21 year old male who presents today as a new patient for spot check.    Today, the patient points to a lesion on his R buttocks that has been present for about 1 year now. It is asymptomatic, but he feels like it has grown in size. He also points to a lesion on the top of his scalp that his girlfriend pointed out a few days ago. There is also a red lesion on his chest that he would like examined. His father has a history of skin cancer.     Patient is otherwise feeling well, without additional skin concerns. 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       Labs Reviewed:  N/A    Physical Exam:  Vitals: There were no vitals taken for this visit.  SKIN: Focused examination of scalp, trunk, and buttocks was performed.  - On the R medial buttock, there is a 2.5 cm x 1.8 cm reddish brown patch fairly uniform in appearance.   - There is a dome shaped bright red papule on the central chest.   - Multiple regular brown pigmented macules and papules are identified on the back and scalp.   - No other lesions of concern on areas examined.     Medications:  Current Outpatient Medications   Medication     amoxicillin-clavulanate (AUGMENTIN) 875-125 MG tablet     No current facility-administered medications for this visit.      Past Medical History:   Patient Active Problem List   Diagnosis     ADHD (attention deficit hyperactivity disorder)     Acne vulgaris     No past medical history on file.     CC  Referred Self, MD  No address on file on close of this encounter.

## 2022-02-08 NOTE — LETTER
2/8/2022         RE: Huy [place]  [address] [place] [address]        Dear Colleague,    Thank you for referring your patient, Huy Perez, to the [facility]. Please see a copy of my visit note below.    [facility] Dermatology Note  Encounter Date: Feb 8, 2022  Office Visit     Dermatology Problem List:  # NUB, R medial buttocks, congenital appearing nevus, growing  per patient r/o dysplasia, s/p bx 2/8/22    ____________________________________________    Assessment & Plan:    # NUB, R medial buttocks, growing congenital appearing nevus per patient r/o dysplasia  - Shave biopsy today, see procedure note below    # Multiple benign nevi.   - No concerning lesions today  - Monitor for ABCDEs of melanoma   - Continue sun protection - recommend SPF 30 or higher with frequent application   - Return sooner if noticing changing or symptomatic lesions    # Cherry angioma  Discussed the natural history and benign nature of this lesion. Reassurance provided that no additional treatment is necessary.     Procedures Performed:   - Shave biopsy procedure note, location(s): R medial buttocks. After discussion of benefits and risks including but not limited to bleeding, infection, scar, incomplete removal, recurrence, and non-diagnostic biopsy, written consent and photographs were obtained. The area was cleaned with isopropyl alcohol. 0.5mL of 1% lidocaine with epinephrine was injected to obtain adequate anesthesia of lesion(s). Shave biopsy at site(s) performed. Hemostasis was achieved with aluminium chloride. Petrolatum ointment and a sterile dressing were applied. The patient tolerated the procedure and no complications were noted. The patient was provided with verbal and written post care instructions.     Follow-up: prn for new or changing lesions    Staff and Scribe:     Scribe Disclosure:  IJazmine, am serving as a scribe to document  services personally performed by Selene Maldonado PA-C based on data collection and the provider's statements to me.     Provider Disclosure:   The documentation recorded by the scribe accurately reflects the services I personally performed and the decisions made by me.    All risks, benefits and alternatives were discussed with patient.  Patient is in agreement and understands the assessment and plan.  All questions were answered.  Sun Screen Education was given.   Return to Clinic as needed.   Selene Maldonado PA-C   [facility]ermat[facility]   ____________________________________________    CC: Derm Problem (sore at the top of buttock, spots on chest, scalp)    HPI:  Mr. Huy Perez is a(n) 21 year old male who presents today as a new patient for spot check.    Today, the patient points to a lesion on his R buttocks that has been present for about 1 year now. It is asymptomatic, but he feels like it has grown in size. He also points to a lesion on the top of his scalp that his girlfriend pointed out a few days ago. There is also a red lesion on his chest that he would like examined. His father has a history of skin cancer.     Patient is otherwise feeling well, without additional skin concerns. 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       Labs Reviewed:  N/A    Physical Exam:  Vitals: There were no vitals taken for this visit.  SKIN: Focused examination of scalp, trunk, and buttocks was performed.  - On the R medial buttock, there is a 2.5 cm x 1.8 cm reddish brown patch fairly uniform in appearance.   - There is a dome shaped bright red papule on the central chest.   - Multiple regular brown pigmented macules and papules are identified on the back and scalp.   - No other lesions of concern on areas examined.     Medications:  Current Outpatient Medications   Medication     amoxicillin-clavulanate (AUGMENTIN)  875-125 MG tablet     No current facility-administered medications for this visit.      Past Medical History:   Patient Active Problem List   Diagnosis     ADHD (attention deficit hyperactivity disorder)     Acne vulgaris     No past medical history on file.     CC Referred Self, MD  No address on file on close of this encounter.        Again, thank you for allowing me to participate in the care of your patient.        Sincerely,        Selene Maldonado PA-C

## 2022-02-08 NOTE — PATIENT INSTRUCTIONS
Wound Care Instructions     FOR SUPERFICIAL WOUNDS     [facility] or     [facility] [phone]          AFTER 24 HOURS YOU SHOULD REMOVE THE BANDAGE AND BEGIN DAILY DRESSING CHANGES AS FOLLOWS:     1) Remove Dressing.     2) Clean and dry the area with tap water using a Q-tip or sterile gauze pad.     3) Apply Vaseline, Aquaphor, Polysporin ointment or Bacitracin ointment over entire wound.  Do NOT use Neosporin ointment.     4) Cover the wound with a band-aid, or a sterile non-stick gauze pad and micropore paper tape      REPEAT THESE INSTRUCTIONS AT LEAST ONCE A DAY UNTIL THE WOUND HAS COMPLETELY HEALED.    It is an old wives tale that a wound heals better when it is exposed to air and allowed to dry out. The wound will heal faster with a better cosmetic result if it is kept moist with ointment and covered with a bandage.    **Do not let the wound dry out.**      Supplies Needed:      *Cotton tipped applicators (Q-tips)    *Polysporin Ointment or Bacitracin Ointment (NOT NEOSPORIN)    *Band-aids or non-stick gauze pads and micropore paper tape.      PATIENT INFORMATION:    During the healing process you will notice a number of changes. All wounds develop a small halo of redness surrounding the wound.  This means healing is occurring. Severe itching with extensive redness usually indicates sensitivity to the ointment or bandage tape used to dress the wound.  You should call our office if this develops.      Swelling  and/or discoloration around your surgical site is common, particularly when performed around the eye.    All wounds normally drain.  The larger the wound the more drainage there will be.  After 7-10 days, you will notice the wound beginning to shrink and new skin will begin to grow.  The wound is healed when you can see skin has formed over the entire area.  A healed wound has a healthy, shiny look to the surface and is red to dark pink in color to normalize.   Wounds may take approximately 4-6 weeks to heal.  Larger wounds may take 6-8 weeks.  After the wound is healed you may discontinue dressing changes.    You may experience a sensation of tightness as your wound heals. This is normal and will gradually subside.    Your healed wound may be sensitive to temperature changes. This sensitivity improves with time, but if you re having a lot of discomfort, try to avoid temperature extremes.    Patients frequently experience itching after their wound appears to have healed because of the continue healing under the skin.  Plain Vaseline will help relieve the itching.        POSSIBLE COMPLICATIONS    BLEEDIN. Leave the bandage in place.  2. Use tightly rolled up gauze or a cloth to apply direct pressure over the bandage for 30  minutes.  3. Reapply pressure for an additional 30 minutes if necessary  4. Use additional gauze and tape to maintain pressure once the bleeding has stopped.

## 2022-02-17 NOTE — TELEPHONE ENCOUNTER
Sent letter     Victoria JOHANSENRN BSN  [facility] [place]  [phone]

## 2022-02-17 NOTE — TELEPHONE ENCOUNTER
----- Message from Selene Maldonado PA-C sent at 2/17/2022 10:41 AM CST -----  You can let Huy know the spot on his buttocks was actually  a form of dermatitis. It is most consistent with a variation of rashes called a fixed drug eruption. It is not birth howard or mole. This is typically in response to something that is ingested into the body such as a supplement or medication. Do you take ibuprofen or any other mediations ? Typically this resolves when you are not exposed to that medication or supplement.

## 2022-02-17 NOTE — LETTER
February 17, 2022    Huy Perez  [address]  [place] [address]        Dear Huy,    Great news! The lesion(s) that was removed has been found to be benign (not cancer) and is called a(n) variation of rashes called a fixed drug eruption.  It is not birth howard or mole. This is typically in response to something that is ingested into the body such as a supplement or medication. Do you take ibuprofen or any other mediations? Typically this resolves when you are not exposed to that medication or supplement.   Thus, no further treatment is needed, just continue wound care until healed.        Thank you for allowing me to be involved in your health care and for choosing [facility].  If you have any questions or concerns please feel free to contact me at [phone].      Sincerely,       Selene Maldonado PA-C